# Patient Record
Sex: FEMALE | Race: WHITE | NOT HISPANIC OR LATINO | URBAN - METROPOLITAN AREA
[De-identification: names, ages, dates, MRNs, and addresses within clinical notes are randomized per-mention and may not be internally consistent; named-entity substitution may affect disease eponyms.]

---

## 2017-03-13 ENCOUNTER — RECORDS - HEALTHEAST (OUTPATIENT)
Dept: LAB | Facility: CLINIC | Age: 62
End: 2017-03-13

## 2017-03-13 LAB
CHOLEST SERPL-MCNC: 181 MG/DL
FASTING STATUS PATIENT QL REPORTED: NORMAL
HDLC SERPL-MCNC: 52 MG/DL
LDLC SERPL CALC-MCNC: 106 MG/DL
TRIGL SERPL-MCNC: 117 MG/DL

## 2018-08-28 ENCOUNTER — RECORDS - HEALTHEAST (OUTPATIENT)
Dept: LAB | Facility: CLINIC | Age: 63
End: 2018-08-28

## 2018-08-28 LAB
CHOLEST SERPL-MCNC: 165 MG/DL
FASTING STATUS PATIENT QL REPORTED: YES
HDLC SERPL-MCNC: 38 MG/DL
LDLC SERPL CALC-MCNC: 91 MG/DL
TRIGL SERPL-MCNC: 182 MG/DL
TSH SERPL DL<=0.005 MIU/L-ACNC: 1.5 UIU/ML (ref 0.3–5)

## 2018-08-29 LAB — HCV AB SERPL QL IA: NEGATIVE

## 2019-10-10 ENCOUNTER — RECORDS - HEALTHEAST (OUTPATIENT)
Dept: LAB | Facility: CLINIC | Age: 64
End: 2019-10-10

## 2019-10-10 LAB
CHOLEST SERPL-MCNC: 168 MG/DL
FASTING STATUS PATIENT QL REPORTED: YES
HDLC SERPL-MCNC: 50 MG/DL
LDLC SERPL CALC-MCNC: 102 MG/DL
TRIGL SERPL-MCNC: 80 MG/DL

## 2020-02-28 ENCOUNTER — COMMUNICATION - HEALTHEAST (OUTPATIENT)
Dept: ADMINISTRATIVE | Facility: HOSPITAL | Age: 65
End: 2020-02-28

## 2020-03-02 ENCOUNTER — RECORDS - HEALTHEAST (OUTPATIENT)
Dept: ADMINISTRATIVE | Facility: OTHER | Age: 65
End: 2020-03-02

## 2020-03-02 ENCOUNTER — OFFICE VISIT - HEALTHEAST (OUTPATIENT)
Dept: ONCOLOGY | Facility: HOSPITAL | Age: 65
End: 2020-03-02

## 2020-03-02 DIAGNOSIS — Z80.41 FAMILY HISTORY OF MALIGNANT NEOPLASM OF OVARY: ICD-10-CM

## 2020-03-02 DIAGNOSIS — Z84.81 FAMILY HISTORY OF BRCA1 GENE POSITIVE: ICD-10-CM

## 2020-03-02 DIAGNOSIS — Z80.3 FAMILY HISTORY OF MALIGNANT NEOPLASM OF BREAST: ICD-10-CM

## 2020-03-02 DIAGNOSIS — Z71.83 ENCOUNTER FOR NONPROCREATIVE GENETIC COUNSELING: ICD-10-CM

## 2020-03-26 ENCOUNTER — COMMUNICATION - HEALTHEAST (OUTPATIENT)
Dept: ONCOLOGY | Facility: HOSPITAL | Age: 65
End: 2020-03-26

## 2020-10-22 ENCOUNTER — RECORDS - HEALTHEAST (OUTPATIENT)
Dept: LAB | Facility: CLINIC | Age: 65
End: 2020-10-22

## 2020-10-22 LAB
CHOLEST SERPL-MCNC: 201 MG/DL
FASTING STATUS PATIENT QL REPORTED: YES
HDLC SERPL-MCNC: 46 MG/DL
LDLC SERPL CALC-MCNC: 127 MG/DL
TRIGL SERPL-MCNC: 140 MG/DL

## 2020-11-11 ENCOUNTER — RECORDS - HEALTHEAST (OUTPATIENT)
Dept: LAB | Facility: CLINIC | Age: 65
End: 2020-11-11

## 2020-11-12 LAB — COVID-19 ANTIBODY IGG: NEGATIVE

## 2021-06-06 NOTE — PROGRESS NOTES
3/2/2020    Referring Provider: Self    Present at Today's visit: Sirena, her  (Jay), and her son (Pradip)    Presenting Information:   I met with Sirena Mcarthur today for genetic counseling at the [facility] to discuss the known  BRCA1 mutation in her family. Specifically, the familial BRCA1 mutation is [id number]. She is here today to review this history, cancer screening recommendations, and available genetic testing options.    Personal History:  Sirena is a 64 y.o. female.  She does not have any personal history of cancer. She does report a history of 3 benign breast lesions beginning in her 30's and that she has dense breasts.      She had her first menstrual period at age 12, her first child at age 22, and reports that she went through menopause at age 52.  Sirena had a total abdominal hysterectomy with bilateral salpingo-oophorectomy at age 56 due to a uterine fibroid. She reports a 1-2 year history of oral contraceptive use in her 20's and that she has never been on hormone replacement therapy.      She no longer receives pap smears given her gynecologic surgery.  She has annual clinical breast exams and mammograms. She reports that her most recent mammogram in October 2019 was normal. She began having colonoscopies at the age of 48. Her most recent colonoscopy she reports was about 5-6 years ago and was normal and follow-up was recommended in 5 years.  She does not regularly do any other cancer screening at this time.  Sirena reported no tobacco use, and rare alcohol use.    Family History: (Please see scanned pedigree for detailed family history information)   Children/Siblings    Sirena has one daughter, age 39, who was diagnosed with stage 4 ovarian cancer at age 39. This daughter underwent genetic testing through [facility], and was discovered to carry a pathogenic BRCA1 mutation.    Sirena has a son, age 34 (Pradip), who is healthy with no reported cancer  history. Pradip has one daughter who is 18 months old and reportedly healthy.    Sirena has a son, age 42, who is health with no reported cancer history. This son has two daughters, ages 7 and 2, and one sone, age 4, who are all reportedly healthy. Sirena reports that the 4-year-old has a history of tracheomalacia.    Sirena has a sister, age 65, with a history of kidney cancer diagnosed at age 54. This sister also has a history of Lewy body dementia and diabetes.     Sirena has a brother, age 61, with a history of heart issues and no reported cancer history.    Sirena has four nieces and nephews, ages 26-31, with no reported cancer histories.  Maternal    Sirena's mother, age 87, has Alzheimer's disease and a history of breast cancer diagnosed at age 66 and underwent a lumpectomy and radiation. Sirena reports that her mother also has a history of a soft tissue sarcoma on her thigh diagnosed at age 80 for which, she underwent radiation therapy.    Sirena has two maternal uncles, ages 74 and 80; both with no reported cancer histories.    Sirena has a maternal aunt who passed away at age 89 with a history of bladder cancer in her 70's.    Sirena has a maternal aunt, age 76, with no reported cancer history. This aunt has a son with muscular dystrophy.     Sirena's maternal grandmother passed away in her early-mid 70's from an unknown type of cancer diagnosed in her 70's.    Sirena's maternal grandfather passed away on his early 70's with a history  bladder cancer diagnosed in his 60's and a heart attack in his 50's.  Paternal     Sirena's father passed away at age 83 from age-related health issues with no reported cancer history.    Sirena has a paternal aunt who passed away at age 44 from liver cancer (unknown mets vs primary).    Sirena has a paternal uncle who passed away in his late 60's from an unknown type of cancer.    Sirena has a number of other paternal aunts and uncles with no reported cancer histories.      Sirena's paternal grandmother passed away in her early 70's with no reported cancer history.    Sirena's paternal grandmother had a sister with two daughters, both with ovarian cancer history; one passed in her 30's from ovarian cancer and the other passed in her 60's from ovarian cancer.    Sirena's paternal grandfather passed away in his 50's from heart issues with no reported cancer history.     Her maternal ethnicity is [place]. Her paternal ethnicity is [place]. There is no reported consanguinity.      Discussion:    Sirena has a family history of breast and ovarian cancer with an identified BRCA1 gene mutation.  Specifically, the mutation identified in her daughter is called [id number].  We discussed that this gene is associated with an increased risk for breast and ovarian cancer for women and male breast and prostate cancer for men.  We discussed the impact of this testing on Sirena and her family in detail.      We discussed the natural history and genetics of hereditary breast and ovarian cancer caused by mutations in the BRCA1 gene. A detailed handout regarding this cancer syndrome and the information we discussed was provided to Sirena at the end of our appointment today.  Topics included: inheritance pattern, cancer risks, cancer screening recommendations, and also risks, benefits and limitations of testing.    We reviewed that the most common cause of hereditary breast cancer is Hereditary Breast and Ovarian Cancer (HBOC) syndrome, which is caused by mutations in the genes BRCA1 and BRCA2. BRCA1 and BRCA2 are two genes that increase the risk for breast and ovarian cancers, among others. Women who inherit a BRCA1 mutation have a 50 to 85% lifetime risk of breast cancer and up to a 45% lifetime risk of ovarian cancer. This is higher than the general population lifetime risks of 12% for breast cancer and less than 2% for ovarian cancer. Men with BRCA gene mutations have up to a 2%  risk of breast cancer and 20% risk of prostate cancer. Other cancers, such as pancreatic cancer and melanoma, have also been associated with BRCA1 mutations.    We reviewed that mutations in the BRCA1 gene are inherited in an autosomal dominant pattern.  This means that Sirena has a 50% chance of inheriting the same mutation which was identified in her daughter.  Mutations in this gene to not skip generations.     Genetic testing is available for the BRCA1 gene mutation identified in Sirena's daughter. Sirena meets National Comprehensive Cancer Network guidelines for genetic testing for the familial BRCA1 mutation.  Sirena elected to proceed with this testing today.    We discussed that Sirena has additional family history of breast and ovarian cancer in her mother and her extended paternal cousins, respectively. We discussed that there are additional genes related to increased breast and ovarian cancer risk. We discussed genetic testing options for Sirena including targeted BRCA1 testing as well as expanded testing to include additional genes related to increased risk for breast and gynecologic cancers (OvaNext) Sirena expressed interest in learning as much information as possible from genetic testing. She opted to proceed with genetic testing via the OvaNext panel.  Genetic testing is available for 25 genes associated with hereditary gynecologic, breast, and related cancers: OvaNext (NARESH, BARD1, BRCA1, BRCA2, BRIP1, CDH1, CHEK2, DICER1, EPCAM, MLH1, MRE11A, MSH2, MSH6, MUTYH, NBN, NF1, PALB2, PMS2, PTEN, RAD50, RAD51C, RAD51D, SMARCA4, STK11, and TP53).  We discussed that some of the genes in the OvaNext panel are associated with specific hereditary cancer syndromes and published management guidelines: Hereditary Breast and Ovarian Cancer syndrome (BRCA1, BRCA2), Delong syndrome (MLH1, MSH2, MSH6, PMS2, EPCAM), Hereditary Diffuse Gastric Cancer (CDH1), Cowden syndrome (PTEN), Li Fraumeni syndrome (TP53),  Peutz-Jeghers syndrome (STK11), MUTYH Associated Polyposis (MUTYH), and Neurofibromatosis type 1 (NF1).    Risk-reducing salpingo-oophorectomy can be considered in women with mutations in BRIP1, RAD51C, or RAD51D. Breast and/or other cancer risk management guidelines are available for NARESH, CHEK2, PALB2, NF1, and NBN.  The remaining genes (BARD1, DICER1, MRE11A, RAD50, and SMARCA4) are associated with increased cancer risk and may allow us to make medical recommendations when mutations are identified.    Consent was obtained and genetic testing for OvaNext was sent to [facility].  A copy of Sirena's daughter's positive test report (performed through [facility]) was also sent to the testing laboratory.    Medical Management: For Sirena, we reviewed that the information from genetic testing may determine:    additional cancer screening for which Sirena may qualify (i.e. mammogram and breast MRI, more frequent colonoscopies, more frequent dermatologic exams, etc.),    options for risk reducing surgeries Sirena could consider (i.e. bilateral mastectomy, etc.),      and targeted chemotherapies for Sirena  if she were to develop certain cancers in the future (i.e. immunotherapy for individuals with Delong syndrome, PARP inhibitors, etc.).     These recommendations and possible targeted chemotherapies will be discussed in detail once genetic testing is completed.     Plan:  1) Today Sirena elected to proceed with OvaNext genetic testing (25 genes) through [facility].  2) This information should be available in approximately 4 weeks.  3) Sirena will be contacted by our scheduling department to set up a result disclosure appointment either in person or over the phone.     Face to face time: 45 minutes    Rhiannon Fox MS, [facility]  Licensed Genetic Counselor  [facility]  [phone]  ;

## 2021-06-07 NOTE — TELEPHONE ENCOUNTER
"3/26/2020    Referring Provider: Self    Presenting Information:  I spoke to Sirena by phone today to discuss her genetic testing results. Her blood was drawn on 03/02/2020. OvaNext test was ordered  from [facility]. This testing was done because of Sirena's daughter's positive genetic test result for a pathogenic BRCA1 mutation ([id number].68_[id number]) as well as her family history of breast and ovarian cancer.    Genetic Testing Result: NEGATIVE  Sirena is negative for mutations in the NARESH, BARD1, BRCA1, BRCA2, BRIP1, CDH1, CHEK2, DICER1, EPCAM, MLH1, MRE11A, MSH2, MSH6, MUTYH, NBN, NF1, PALB2, PMS2, PTEN, RAD50, RAD51C, RAD51D, SMARCA4, STK11, and TP53 genes. No mutations were found in any of the 25 genes analyzed. This test involved sequencing and deletion/duplication analysis of all genes with the exceptions of EPCAM (deletions/duplications only).    Testing did not detect an identifiable mutation associated with Hereditary Breast and Ovarian Cancer syndrome (BRCA1, BRCA2), Delong syndrome (MLH1, MSH2, MSH6, PMS2, EPCAM), Hereditary Diffuse Gastric Cancer (CDH1), Cowden syndrome (PTEN), Li Fraumeni syndrome (TP53), Peutz-Jeghers syndrome (STK11), MUTYH Associated Polyposis (MUTYH), or Neurofibromatosis type 1 (NF1).     A copy of the test report can be found in the Media tab and named \"Genetics Scan-[employer] [facility]\". The report is scanned in as a linked document.    Interpretation:  We discussed several different interpretations of this negative test result.    1. Sirena did NOT test positive for the same BRCA1 mutation identified in her daughter. This means that the mutation discovered in her daughter did not come from Sirena's side of the family. Sirena does not share the same genetic risk factor that caused or at least significantly contributed to her daughter's ovarian cancer.   2. One explanation for Sirena's remaining family history of breast and/or ovarian cancer may be that there is a different gene or combination of " genes and environment that are associated with the cancers inher relatives.    3. It is possible that her her mother or paternal relatives with ovarian cancer history did have a mutation in one of the genes that Sirena was tested for, and she did not inherit it.  4. There is also a small possibility that there is a mutation in one of these genes, and we could not find it with our current testing methods.       Screening:  Based on this negative test result, it is important for Sirena and her relatives to refer back to the family history for appropriate cancer screening.      Based on her personal and family history, Sirena has a 7.4% lifetime risk of developing breast cancer based on the ALDO (v8) model.  Therefore, Sirena does not meet current National Comprehensive Cancer Network (NCCN) guidelines for high risk breast screening, which is offered to women with a 20% lifetime risk or higher. However, it is still important for Sirena to continue with routine breast screening under the care of her physicians. Breast cancer screening is generally recommended to begin approximately 10 years younger than the earliest age of breast cancer diagnosis in the family, or at age 40, whichever comes first.  In this family, screening may begin at age 40.  Sirena is encouraged to discuss breast screening with her physicians.     Other population cancer screening options, such as those recommended by the American Cancer Society and the National Comprehensive Cancer Network (NCCN), are also appropriate for Sirena and her family. These screening recommendations may change if there are changes to Sirena's personal and/or family history. Final screening recommendations should be made by each individual's managing physician.     Inheritance:  We reviewed the autosomal dominant inheritance of mutations in these 25 genes.  We discussed that Sirena cannot/did not pass on an identifiable mutation in these genes to her children based on this  test result.  Mutations in these genes do not skip generations.      Additional Testing Considerations:  Although Sirena's genetic testing result was negative, other relatives may still carry a gene mutation associated with breast and/or ovarian cancer. Genetic counseling is recommended for her paternal relatives closely related to her relatives with ovarian cancer history to discuss genetic testing options. If any of these relatives do pursue genetic testing, Sirena is encouraged to contact me so that we may review the impact of their test results on her.    Summary:  Sirena tested negative for the BRCA1 mutation found in her daughter as well as mutations in 25 genes related to breast and ovarian cancer risk. We do not have an explanation for Sirena's family history of cancer.  Because of that, it is important that she continue with cancer screening based on her personal and family history as discussed above.    Genetic testing is rapidly advancing, and new cancer susceptibility genes will most likely be identified in the future.  Therefore, I encouraged Sirena to contact me annually or if there are changes in her personal or family history.  This may change how we assess her cancer risk, screening, and the testing we would offer.    Plan:  1. A copy of the test results will be mailed to Sirena.  2. She plans to follow-up with her primary care providers.  3. She should contact me annually, or sooner if her family history changes.    If Sirena has any further questions, I encouraged her to contact me at [phone].    Time spent on the phone: 15 minutes.    Rhiannon Fox MS, [facility]  Licensed Genetic Counselor  [facility]  [phone]

## 2021-06-20 NOTE — LETTER
"Letter by Rhiannon Fox, Genetic Counselor at      Author: Rhiannon Fox, Genetic Counselor Service: -- Author Type: --    Filed:  Encounter Date: 3/26/2020 Status: (Other)         Sirena Mcarthur  [address]  [address]  [place] [address]      April 6, 2020      Dear Ms. Mcarthur,    It was a pleasure speaking with you on the phone on 3/26/2020.  Here is a copy of the progress note from our discussion.  If you have any additional questions, please feel free to call.    Referring Provider: Self    Presenting Information:  I spoke to Sirena by phone today to discuss her genetic testing results. Her blood was drawn on 03/02/2020. OvaNext test was ordered  from [facility]. This testing was done because of Sirena's daughter's positive genetic test result for a pathogenic BRCA1 mutation (c.68_[id number]) as well as her family history of breast and ovarian cancer.    Genetic Testing Result: NEGATIVE  Sirena is negative for mutations in the NARESH, BARD1, BRCA1, BRCA2, BRIP1, CDH1, CHEK2, DICER1, EPCAM, MLH1, MRE11A, MSH2, MSH6, MUTYH, NBN, NF1, PALB2, PMS2, PTEN, RAD50, RAD51C, RAD51D, SMARCA4, STK11, and TP53 genes. No mutations were found in any of the 25 genes analyzed. This test involved sequencing and deletion/duplication analysis of all genes with the exceptions of EPCAM (deletions/duplications only).    Testing did not detect an identifiable mutation associated with Hereditary Breast and Ovarian Cancer syndrome (BRCA1, BRCA2), Delong syndrome (MLH1, MSH2, MSH6, PMS2, EPCAM), Hereditary Diffuse Gastric Cancer (CDH1), Cowden syndrome (PTEN), Li Fraumeni syndrome (TP53), Peutz-Jeghers syndrome (STK11), MUTYH Associated Polyposis (MUTYH), or Neurofibromatosis type 1 (NF1).     A copy of the test report can be found in the Media tab and named \"Genetics Scan-[employer]\". The report is scanned in as a linked document.    Interpretation:  We discussed several different interpretations of this negative test result.  "   1. Sirena did NOT test positive for the same BRCA1 mutation identified in her daughter. This means that the mutation discovered in her daughter did not come from Sirena's side of the family. Sirena does not share the same genetic risk factor that caused or at least significantly contributed to her daughter's ovarian cancer.   2. One explanation for Sirena's remaining family history of breast and/or ovarian cancer may be that there is a different gene or combination of genes and environment that are associated with the cancers inher relatives.    3. It is possible that her her mother or paternal relatives with ovarian cancer history did have a mutation in one of the genes that Sirena was tested for, and she did not inherit it.  4. There is also a small possibility that there is a mutation in one of these genes, and we could not find it with our current testing methods.       Screening:  Based on this negative test result, it is important for Sirena and her relatives to refer back to the family history for appropriate cancer screening.      Based on her personal and family history, Sirena has a 7.4% lifetime risk of developing breast cancer based on the ALDO (v8) model.  Therefore, Sirena does not meet current National Comprehensive Cancer Network (NCCN) guidelines for high risk breast screening, which is offered to women with a 20% lifetime risk or higher. However, it is still important for Sirena to continue with routine breast screening under the care of her physicians. Breast cancer screening is generally recommended to begin approximately 10 years younger than the earliest age of breast cancer diagnosis in the family, or at age 40, whichever comes first.  In this family, screening may begin at age 40.  Sirena is encouraged to discuss breast screening with her physicians.     Other population cancer screening options, such as those recommended by the American Cancer Society and the National Comprehensive Cancer  Network (NCCN), are also appropriate for Sirena and her family. These screening recommendations may change if there are changes to Sirena's personal and/or family history. Final screening recommendations should be made by each individual's managing physician.     Inheritance:  We reviewed the autosomal dominant inheritance of mutations in these 25 genes.  We discussed that Sirena cannot/did not pass on an identifiable mutation in these genes to her children based on this test result.  Mutations in these genes do not skip generations.      Additional Testing Considerations:  Although Sirena's genetic testing result was negative, other relatives may still carry a gene mutation associated with breast and/or ovarian cancer. Genetic counseling is recommended for her paternal relatives closely related to her relatives with ovarian cancer history to discuss genetic testing options. If any of these relatives do pursue genetic testing, Sirena is encouraged to contact me so that we may review the impact of their test results on her.    Summary:  Sirena tested negative for the BRCA1 mutation found in her daughter as well as mutations in 25 genes related to breast and ovarian cancer risk. We do not have an explanation for Sirena's family history of cancer.  Because of that, it is important that she continue with cancer screening based on her personal and family history as discussed above.    Genetic testing is rapidly advancing, and new cancer susceptibility genes will most likely be identified in the future.  Therefore, I encouraged Sirena to contact me annually or if there are changes in her personal or family history.  This may change how we assess her cancer risk, screening, and the testing we would offer.    Plan:  1. A copy of the test results will be mailed to Sirena.  2. She plans to follow-up with her primary care providers.  3. She should contact me annually, or sooner if her family history changes.    If Sirena has  any further questions, I encouraged her to contact me at [phone].    Rhiannon Fox MS, [facility]  Licensed Genetic Counselor  [facility]  [phone]

## 2021-07-01 ENCOUNTER — RECORDS - HEALTHEAST (OUTPATIENT)
Dept: LAB | Facility: CLINIC | Age: 66
End: 2021-07-01

## 2021-07-01 LAB
ANION GAP SERPL CALCULATED.3IONS-SCNC: 12 MMOL/L (ref 5–18)
BUN SERPL-MCNC: 19 MG/DL (ref 8–22)
CALCIUM SERPL-MCNC: 9.2 MG/DL (ref 8.5–10.5)
CHLORIDE BLD-SCNC: 103 MMOL/L (ref 98–107)
CHOLEST SERPL-MCNC: 194 MG/DL
CO2 SERPL-SCNC: 25 MMOL/L (ref 22–31)
CREAT SERPL-MCNC: 0.73 MG/DL (ref 0.6–1.1)
FASTING STATUS PATIENT QL REPORTED: NO
GFR SERPL CREATININE-BSD FRML MDRD: >60 ML/MIN/1.73M2
GLUCOSE BLD-MCNC: 101 MG/DL (ref 70–125)
HDLC SERPL-MCNC: 53 MG/DL
LDLC SERPL CALC-MCNC: 115 MG/DL
POTASSIUM BLD-SCNC: 4.4 MMOL/L (ref 3.5–5)
SODIUM SERPL-SCNC: 140 MMOL/L (ref 136–145)
TRIGL SERPL-MCNC: 129 MG/DL

## 2021-07-12 ENCOUNTER — LAB REQUISITION (OUTPATIENT)
Dept: LAB | Facility: CLINIC | Age: 66
End: 2021-07-12
Payer: COMMERCIAL

## 2021-07-12 DIAGNOSIS — Z01.818 ENCOUNTER FOR OTHER PREPROCEDURAL EXAMINATION: ICD-10-CM

## 2021-07-12 PROCEDURE — U0003 INFECTIOUS AGENT DETECTION BY NUCLEIC ACID (DNA OR RNA); SEVERE ACUTE RESPIRATORY SYNDROME CORONAVIRUS 2 (SARS-COV-2) (CORONAVIRUS DISEASE [COVID-19]), AMPLIFIED PROBE TECHNIQUE, MAKING USE OF HIGH THROUGHPUT TECHNOLOGIES AS DESCRIBED BY CMS-2020-01-R: HCPCS | Mod: ORL | Performed by: FAMILY MEDICINE

## 2021-07-13 LAB — SARS-COV-2 RNA RESP QL NAA+PROBE: NEGATIVE

## 2021-11-04 ENCOUNTER — LAB REQUISITION (OUTPATIENT)
Dept: LAB | Facility: CLINIC | Age: 66
End: 2021-11-04
Payer: COMMERCIAL

## 2021-11-04 DIAGNOSIS — Z20.822 CONTACT WITH AND (SUSPECTED) EXPOSURE TO COVID-19: ICD-10-CM

## 2021-11-04 PROCEDURE — U0003 INFECTIOUS AGENT DETECTION BY NUCLEIC ACID (DNA OR RNA); SEVERE ACUTE RESPIRATORY SYNDROME CORONAVIRUS 2 (SARS-COV-2) (CORONAVIRUS DISEASE [COVID-19]), AMPLIFIED PROBE TECHNIQUE, MAKING USE OF HIGH THROUGHPUT TECHNOLOGIES AS DESCRIBED BY CMS-2020-01-R: HCPCS | Mod: ORL | Performed by: FAMILY MEDICINE

## 2021-11-05 LAB — SARS-COV-2 RNA RESP QL NAA+PROBE: NEGATIVE

## 2022-09-15 ENCOUNTER — LAB REQUISITION (OUTPATIENT)
Dept: LAB | Facility: CLINIC | Age: 67
End: 2022-09-15

## 2022-09-15 DIAGNOSIS — E78.5 HYPERLIPIDEMIA, UNSPECIFIED: ICD-10-CM

## 2022-09-15 LAB
ANION GAP SERPL CALCULATED.3IONS-SCNC: 9 MMOL/L (ref 7–15)
BUN SERPL-MCNC: 16 MG/DL (ref 8–23)
CALCIUM SERPL-MCNC: 9.3 MG/DL (ref 8.8–10.2)
CHLORIDE SERPL-SCNC: 102 MMOL/L (ref 98–107)
CHOLEST SERPL-MCNC: 198 MG/DL
CREAT SERPL-MCNC: 0.71 MG/DL (ref 0.51–0.95)
DEPRECATED HCO3 PLAS-SCNC: 29 MMOL/L (ref 22–29)
GFR SERPL CREATININE-BSD FRML MDRD: >90 ML/MIN/1.73M2
GLUCOSE SERPL-MCNC: 93 MG/DL (ref 70–99)
HDLC SERPL-MCNC: 51 MG/DL
LDLC SERPL CALC-MCNC: 124 MG/DL
NONHDLC SERPL-MCNC: 147 MG/DL
POTASSIUM SERPL-SCNC: 4.1 MMOL/L (ref 3.4–5.3)
SODIUM SERPL-SCNC: 140 MMOL/L (ref 136–145)
TRIGL SERPL-MCNC: 114 MG/DL

## 2022-09-15 PROCEDURE — 80061 LIPID PANEL: CPT | Performed by: FAMILY MEDICINE

## 2022-09-15 PROCEDURE — 80048 BASIC METABOLIC PNL TOTAL CA: CPT | Performed by: FAMILY MEDICINE

## 2023-04-12 ENCOUNTER — LAB REQUISITION (OUTPATIENT)
Dept: LAB | Facility: CLINIC | Age: 68
End: 2023-04-12

## 2023-04-12 DIAGNOSIS — I10 ESSENTIAL (PRIMARY) HYPERTENSION: ICD-10-CM

## 2023-04-12 LAB
ANION GAP SERPL CALCULATED.3IONS-SCNC: 16 MMOL/L (ref 7–15)
BUN SERPL-MCNC: 19.4 MG/DL (ref 8–23)
CALCIUM SERPL-MCNC: 10.4 MG/DL (ref 8.8–10.2)
CHLORIDE SERPL-SCNC: 101 MMOL/L (ref 98–107)
CREAT SERPL-MCNC: 0.78 MG/DL (ref 0.51–0.95)
DEPRECATED HCO3 PLAS-SCNC: 23 MMOL/L (ref 22–29)
GFR SERPL CREATININE-BSD FRML MDRD: 81 ML/MIN/1.73M2
GLUCOSE SERPL-MCNC: 100 MG/DL (ref 70–99)
POTASSIUM SERPL-SCNC: 4.5 MMOL/L (ref 3.4–5.3)
SODIUM SERPL-SCNC: 140 MMOL/L (ref 136–145)

## 2023-04-12 PROCEDURE — 80048 BASIC METABOLIC PNL TOTAL CA: CPT | Performed by: FAMILY MEDICINE

## 2023-08-16 ENCOUNTER — LAB REQUISITION (OUTPATIENT)
Dept: LAB | Facility: CLINIC | Age: 68
End: 2023-08-16

## 2023-08-16 DIAGNOSIS — E78.2 MIXED HYPERLIPIDEMIA: ICD-10-CM

## 2023-08-16 DIAGNOSIS — I10 ESSENTIAL (PRIMARY) HYPERTENSION: ICD-10-CM

## 2023-08-16 DIAGNOSIS — E83.52 HYPERCALCEMIA: ICD-10-CM

## 2023-08-16 LAB
ANION GAP SERPL CALCULATED.3IONS-SCNC: 12 MMOL/L (ref 7–15)
BUN SERPL-MCNC: 22.1 MG/DL (ref 8–23)
CALCIUM SERPL-MCNC: 9.3 MG/DL (ref 8.8–10.2)
CHLORIDE SERPL-SCNC: 105 MMOL/L (ref 98–107)
CHOLEST SERPL-MCNC: 136 MG/DL
CREAT SERPL-MCNC: 0.74 MG/DL (ref 0.51–0.95)
DEPRECATED HCO3 PLAS-SCNC: 25 MMOL/L (ref 22–29)
GFR SERPL CREATININE-BSD FRML MDRD: 87 ML/MIN/1.73M2
GLUCOSE SERPL-MCNC: 94 MG/DL (ref 70–99)
HDLC SERPL-MCNC: 49 MG/DL
LDLC SERPL CALC-MCNC: 72 MG/DL
NONHDLC SERPL-MCNC: 87 MG/DL
POTASSIUM SERPL-SCNC: 4.5 MMOL/L (ref 3.4–5.3)
SODIUM SERPL-SCNC: 142 MMOL/L (ref 136–145)
TRIGL SERPL-MCNC: 77 MG/DL

## 2023-08-16 PROCEDURE — 80048 BASIC METABOLIC PNL TOTAL CA: CPT | Performed by: FAMILY MEDICINE

## 2023-08-16 PROCEDURE — 80061 LIPID PANEL: CPT | Performed by: FAMILY MEDICINE

## 2024-03-13 ENCOUNTER — LAB REQUISITION (OUTPATIENT)
Dept: LAB | Facility: CLINIC | Age: 69
End: 2024-03-13

## 2024-03-13 DIAGNOSIS — I10 ESSENTIAL (PRIMARY) HYPERTENSION: ICD-10-CM

## 2024-03-13 PROCEDURE — 80061 LIPID PANEL: CPT | Performed by: FAMILY MEDICINE

## 2024-03-13 PROCEDURE — 80048 BASIC METABOLIC PNL TOTAL CA: CPT | Performed by: FAMILY MEDICINE

## 2024-03-14 LAB
ANION GAP SERPL CALCULATED.3IONS-SCNC: 11 MMOL/L (ref 7–15)
BUN SERPL-MCNC: 15.1 MG/DL (ref 8–23)
CALCIUM SERPL-MCNC: 9.5 MG/DL (ref 8.8–10.2)
CHLORIDE SERPL-SCNC: 103 MMOL/L (ref 98–107)
CHOLEST SERPL-MCNC: 132 MG/DL
CREAT SERPL-MCNC: 0.78 MG/DL (ref 0.51–0.95)
DEPRECATED HCO3 PLAS-SCNC: 26 MMOL/L (ref 22–29)
EGFRCR SERPLBLD CKD-EPI 2021: 81 ML/MIN/1.73M2
FASTING STATUS PATIENT QL REPORTED: ABNORMAL
GLUCOSE SERPL-MCNC: 92 MG/DL (ref 70–99)
HDLC SERPL-MCNC: 47 MG/DL
LDLC SERPL CALC-MCNC: 60 MG/DL
NONHDLC SERPL-MCNC: 85 MG/DL
POTASSIUM SERPL-SCNC: 4.5 MMOL/L (ref 3.4–5.3)
SODIUM SERPL-SCNC: 140 MMOL/L (ref 135–145)
TRIGL SERPL-MCNC: 124 MG/DL

## 2025-04-30 ENCOUNTER — LAB REQUISITION (OUTPATIENT)
Dept: LAB | Facility: CLINIC | Age: 70
End: 2025-04-30

## 2025-04-30 DIAGNOSIS — Z01.818 ENCOUNTER FOR OTHER PREPROCEDURAL EXAMINATION: ICD-10-CM

## 2025-04-30 LAB
ANION GAP SERPL CALCULATED.3IONS-SCNC: 13 MMOL/L (ref 7–15)
BUN SERPL-MCNC: 18 MG/DL (ref 8–23)
CALCIUM SERPL-MCNC: 9.6 MG/DL (ref 8.8–10.4)
CHLORIDE SERPL-SCNC: 105 MMOL/L (ref 98–107)
CREAT SERPL-MCNC: 0.75 MG/DL (ref 0.51–0.95)
EGFRCR SERPLBLD CKD-EPI 2021: 84 ML/MIN/1.73M2
GLUCOSE SERPL-MCNC: 100 MG/DL (ref 70–99)
HCO3 SERPL-SCNC: 24 MMOL/L (ref 22–29)
POTASSIUM SERPL-SCNC: 4.1 MMOL/L (ref 3.4–5.3)
SODIUM SERPL-SCNC: 142 MMOL/L (ref 135–145)

## 2025-04-30 PROCEDURE — 80048 BASIC METABOLIC PNL TOTAL CA: CPT | Performed by: FAMILY MEDICINE
